# Patient Record
Sex: MALE | Race: WHITE
[De-identification: names, ages, dates, MRNs, and addresses within clinical notes are randomized per-mention and may not be internally consistent; named-entity substitution may affect disease eponyms.]

---

## 2017-05-01 ENCOUNTER — HOSPITAL ENCOUNTER (OUTPATIENT)
Dept: HOSPITAL 56 - MW.CHFP | Age: 52
End: 2017-05-01
Attending: PHYSICIAN ASSISTANT
Payer: COMMERCIAL

## 2017-05-01 DIAGNOSIS — S69.91XA: Primary | ICD-10-CM
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
## 2020-02-29 ENCOUNTER — HOSPITAL ENCOUNTER (EMERGENCY)
Dept: HOSPITAL 56 - MW.ED | Age: 55
Discharge: HOME | End: 2020-02-29
Payer: COMMERCIAL

## 2020-02-29 VITALS — SYSTOLIC BLOOD PRESSURE: 143 MMHG | HEART RATE: 73 BPM | DIASTOLIC BLOOD PRESSURE: 87 MMHG

## 2020-02-29 DIAGNOSIS — Z88.6: ICD-10-CM

## 2020-02-29 DIAGNOSIS — S13.9XXA: Primary | ICD-10-CM

## 2020-02-29 DIAGNOSIS — Z79.899: ICD-10-CM
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
## 2020-02-29 NOTE — CT
CT cervical spine

 

Technique: Multiple axial sections were obtained from above C1 

inferiorly through the bottom of T2.  Reconstructed sagittal and 

coronal images were obtained.

 

Comparison: No prior cervical spine imaging is available.

 

Findings: Vertebral body heights are maintained.  Anterior osteophytes

 are noted throughout the cervical and upper thoracic spine.  

Vertebral bodies and posterior arches are intact.  No fracture is 

seen.  No bony central or bony neural foraminal stenosis is seen.  No 

abnormal subluxation is seen on the reconstructed sagittal images.  

Ligamentum nuchal calcification is seen.  Small bony density is seen 

off the inferior tip of the occipital bone at the foramen magnum.  

This is felt to be fairly well corticated and is felt to be old.  Mild

 degenerative change is noted between the dens and anterior arch of 

C1.

 

Impression:

1.  Degenerative change and other findings as noted above.

2.  Nothing acute is appreciated on CT study of the cervical spine.

 

Diagnostic code #2

 

This report was dictated in [place] Standard Time
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
